# Patient Record
Sex: MALE | Race: WHITE | URBAN - METROPOLITAN AREA
[De-identification: names, ages, dates, MRNs, and addresses within clinical notes are randomized per-mention and may not be internally consistent; named-entity substitution may affect disease eponyms.]

---

## 2017-02-01 ENCOUNTER — IMPORTED ENCOUNTER (OUTPATIENT)
Dept: URBAN - METROPOLITAN AREA CLINIC 50 | Facility: CLINIC | Age: 72
End: 2017-02-01

## 2018-03-07 ENCOUNTER — IMPORTED ENCOUNTER (OUTPATIENT)
Dept: URBAN - METROPOLITAN AREA CLINIC 50 | Facility: CLINIC | Age: 73
End: 2018-03-07

## 2018-04-30 NOTE — PATIENT DISCUSSION
New Prescription: prednisolone acetate (prednisolone acetate): drops,suspension: 1% 1 drop every two hours as directed into left eye 04-

## 2018-04-30 NOTE — PATIENT DISCUSSION
ACUTE IRITIS,  OS__: PRESCRIBE _PRED Q HR FOR 4 HRS THEN Q2HRS, THEN Q3HRS FOR 2 DAYS, Q4HRS FOR 2 DAYS THEN QID FOR A FEW DAYS, TID FOR FEW DAYS, BID FOR FEW DAYS THEN QD FOR A FEW DAYS THEN STOP_______GAVE SAMPLES OF DUREZOL IF PRED ISN'T COVERED________. 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 FOLLOW-UP AS SCHEDULED.

## 2018-05-07 NOTE — PATIENT DISCUSSION
ACUTE IRITIS,  OS__: TAPER PRED THRU FRIDAY BID OS, IF BETTER FRIDAY START QD AND CONTINUE FOR 1 WEEK QD THEN STOP . 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 FOLLOW-UP AS SCHEDULED.

## 2018-05-07 NOTE — PATIENT DISCUSSION
Continue: prednisolone acetate (prednisolone acetate): drops,suspension: 1% 1 drop three times a day as directed into left eye 04-

## 2019-03-13 ENCOUNTER — IMPORTED ENCOUNTER (OUTPATIENT)
Dept: URBAN - METROPOLITAN AREA CLINIC 50 | Facility: CLINIC | Age: 74
End: 2019-03-13

## 2020-03-23 ENCOUNTER — IMPORTED ENCOUNTER (OUTPATIENT)
Dept: URBAN - METROPOLITAN AREA CLINIC 50 | Facility: CLINIC | Age: 75
End: 2020-03-23

## 2021-04-17 ASSESSMENT — VISUAL ACUITY
OS_SC: 20/30
OD_SC: 20/30
OS_SC: 20/50
OD_OTHER: 20/70. 20/100.
OS_OTHER: 20/40. 20/50.
OD_SC: 20/25-2
OD_OTHER: 20/40. 20/60.
OS_BAT: 20/40
OD_SC: 20/25
OS_SC: 20/50+2
OS_CC: J1
OD_CC: J2@ 18 IN
OD_BAT: 20/70
OD_OTHER: 20/30. 20/50.
OS_OTHER: 20/30. 20/50.
OD_BAT: 20/30
OS_CC: J2
OS_CC: J2@ 18 IN
OS_BAT: 20/100
OD_BAT: 20/40
OD_CC: J2
OS_BAT: 20/40
OS_BAT: 20/30
OD_BAT: 20/30
OD_CC: J1
OD_SC: 20/25-
OS_OTHER: 20/100. >20/400.
OS_OTHER: 20/40. 20/60.
OD_OTHER: 20/30. 20/40.
OS_SC: 20/25-

## 2021-04-17 ASSESSMENT — TONOMETRY
OS_IOP_MMHG: 16
OD_IOP_MMHG: 15
OD_IOP_MMHG: 16
OS_IOP_MMHG: 14
OD_IOP_MMHG: 13
OS_IOP_MMHG: 14
OD_IOP_MMHG: 16
OS_IOP_MMHG: 16

## 2023-06-23 ENCOUNTER — PREPPED CHART (OUTPATIENT)
Dept: URBAN - METROPOLITAN AREA CLINIC 49 | Facility: CLINIC | Age: 78
End: 2023-06-23

## 2024-01-16 ENCOUNTER — PREPPED CHART (OUTPATIENT)
Dept: URBAN - METROPOLITAN AREA CLINIC 49 | Facility: LOCATION | Age: 79
End: 2024-01-16

## 2024-01-24 ENCOUNTER — NEW PATIENT (OUTPATIENT)
Dept: URBAN - METROPOLITAN AREA CLINIC 49 | Facility: LOCATION | Age: 79
End: 2024-01-24

## 2024-01-24 DIAGNOSIS — H35.373: ICD-10-CM

## 2024-01-24 DIAGNOSIS — H25.13: ICD-10-CM

## 2024-01-24 DIAGNOSIS — Z79.4: ICD-10-CM

## 2024-01-24 DIAGNOSIS — H43.813: ICD-10-CM

## 2024-01-24 DIAGNOSIS — E11.9: ICD-10-CM

## 2024-01-24 PROCEDURE — 92015 DETERMINE REFRACTIVE STATE: CPT

## 2024-01-24 PROCEDURE — 92004 COMPRE OPH EXAM NEW PT 1/>: CPT

## 2024-01-24 PROCEDURE — 92134 CPTRZ OPH DX IMG PST SGM RTA: CPT

## 2024-01-24 ASSESSMENT — VISUAL ACUITY
OS_GLARE: 20/50
OU_SC: 20/25-1
OD_GLARE: 20/40
OS_SC: 20/40
OS_GLARE: 20/40
OD_SC: 20/25-2
OD_GLARE: 20/40
OU_SC: J2 @ 15IN

## 2024-01-24 ASSESSMENT — TONOMETRY
OS_IOP_MMHG: 17
OD_IOP_MMHG: 17